# Patient Record
Sex: MALE | Race: WHITE | NOT HISPANIC OR LATINO | URBAN - METROPOLITAN AREA
[De-identification: names, ages, dates, MRNs, and addresses within clinical notes are randomized per-mention and may not be internally consistent; named-entity substitution may affect disease eponyms.]

---
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
## 2022-02-11 VITALS
SYSTOLIC BLOOD PRESSURE: 140 MMHG | TEMPERATURE: 98 F | BODY MASS INDEX: 26.35 KG/M2 | HEIGHT: 71 IN | HEART RATE: 88 BPM | WEIGHT: 188.2 LBS | DIASTOLIC BLOOD PRESSURE: 60 MMHG

## 2022-02-16 NOTE — LETTER
(Inserted Image. Unable to display)   March 10, 2020        KAPIL RENNER  [address] [place] [address]        Dear KAPIL,      Thank you for selecting [facility] for your healthcare needs.    Our records indicate you are due for the following services:     Immunizations    To schedule an appointment or if you have further questions, please contact your primary clinic:   [facility]       [phone]   [facility]       [phone]              [facility]     [phone]      Powered by [employer]    Sincerely,    Landry Mendez M.D.

## 2022-02-16 NOTE — PROGRESS NOTES
Patient:   KAPIL RENNER            MRN: [id number]            FIN: [id number]               Age:   16 years     Sex:  Male     :  2000   Associated Diagnoses:   Abscess, perirectal   Author:   Landry Mendez MD      Visit Information      Date of Service: 03/15/2017 09:31 am  Performing Location: [facility]  Encounter#: [id number]      Chief Complaint   3/15/2017 9:32 AM CDT    c/o hemorrhoid x1wk, pain getting worse.   denies having constipation.      History of Present Illness   Patient is here for hemorrhoids that he has been having for the past week.  The pain is getting worse and patient is very uncomfortable to sit and to have a BM.  He denies issues with diarrhea and constipation.  He does do alot of weight lifting and was diagnosed with a staph infection during wrestling season.  Patient is unsure what kind of staph infection he had--MRSA unknown.      Review of Systems   Constitutional:  No fever, No chills, No decreased activity.    Eye:  Negative.    Ear/Nose/Mouth/Throat:  Negative.    Respiratory:  Negative.    Gastrointestinal:  Hemorrhoids, No diarrhea, No constipation.    Immunologic:  No recurrent fevers, No recurrent infections.    Neurologic:  Alert and oriented X4.       Health Status   Allergies:    Allergic Reactions (Selected)  Severity Not Documented  DTaP (Rash)  Penicillin (No reactions were documented)      Histories   Past Medical History:    No active or resolved past medical history items have been selected or recorded.   Family History:    No family history items have been selected or recorded.   Procedure history:    No active procedure history items have been selected or recorded.   Social History:             No active social history items have been recorded.      Physical Examination   Vital Signs   3/15/2017 9:32 AM CDT Temperature Tympanic 98 DegF    Peripheral Pulse Rate 88 bpm    Pulse Site Radial artery    HR Method Manual    Systolic Blood  Pressure 140 mmHg  HI    Diastolic Blood Pressure 60 mmHg    Mean Arterial Pressure 87 mmHg    BP Site Right arm    BP Method Manual      Measurements from flowsheet : Measurements   3/15/2017 9:32 AM CDT Height Measured - Standard 70.75 in    Weight Measured - Standard 188.2 lb    BSA 2.06 m2    Body Mass Index 26.43 kg/m2    Body Mass Index Percentile 94.01      General:  Alert and oriented, Mild distress.    Eye:  Normal conjunctiva.    HENT:  Normal hearing.    Gastrointestinal:  Soft, Non-tender, Non-distended.         Rectum/ anus: Tender, erythema and tenderness right perianal area.    Integumentary:  Warm, Pink, Intact.    Neurologic:  Alert, Oriented.    Psychiatric:  Cooperative, Normal judgment.       Impression and Plan   Diagnosis     Abscess, perirectal (EST75-BC K61.2).     Course:  Worsening.    Plan:  Discussion of hemorrhoids and what they are and how they are formed were discussed with the patient and his father.    After examination of the rectum, it appears that the patient has the start of an early perirectal abscess rather than a hemorrhoid.  Will have patient see the general surgeon for a consult.  A referral will be made..    Patient Instructions:       Counseled: Patient, Family, Regarding diagnosis, Regarding treatment, Verbalized understanding.    Orders     Orders (Selected)   Outpatient Orders  Ordered  Referral (Request): 03/15/17 10:02:00 CDT, Referred to: General Surgery, Referred to: either Dr. Lam or Dr. Basilio., Reason for referral: perirectal abscess, Abscess, perirectal.     ITheresa MA, acted solely as a scribe for, and in the presence of Dr. Landry Mendez who performed the services.    ILandry MD, personally performed the services described in this documentation.  The documentation was scribed in my presence and is both accurate and complete.